# Patient Record
Sex: FEMALE | URBAN - METROPOLITAN AREA
[De-identification: names, ages, dates, MRNs, and addresses within clinical notes are randomized per-mention and may not be internally consistent; named-entity substitution may affect disease eponyms.]

---

## 2020-11-14 ENCOUNTER — VIRTUAL VISIT (OUTPATIENT)
Dept: FAMILY MEDICINE | Facility: OTHER | Age: 30
End: 2020-11-14
Payer: COMMERCIAL

## 2020-11-14 PROCEDURE — 99421 OL DIG E/M SVC 5-10 MIN: CPT | Performed by: PHYSICIAN ASSISTANT

## 2020-11-15 NOTE — PROGRESS NOTES
"Date: 2020 12:28:15  Clinician: Lolis Rosas  Clinician NPI: [id number]  Patient: Kaay Tamez  Patient : 1990  Patient Address: [address] [place] [id number]  Patient Phone: [phone]  Visit Protocol: General skin conditions  Patient Summary:  Kaya is a 30 year old ( : 1990 ) female who initiated a [facility] Visit for evaluation of an unspecified skin condition. When asked the question \"Please sign me up to receive news, health information and promotions from [facility].\", Kaya responded \"No\".    Images of her skin condition were uploaded.  Her symptoms started more than a month ago and affect both sides of her body. The skin condition is located on her feet. The skin condition is black and yellow in color.   The affected area has dry/flaky skin. It feels itchy. The symptoms do not interfere with her sleep.   The skin condition has changed since the symptoms started. Description of changes as reported by the patient (free text): I am pretty sure I have toenail fungus but did not choose athlete's foot as the description only related to skin infection.  The fungus is affecting both of my big toe nails.  It started as a small amount of flakiness and yellowing on both of these nails.  The flakiness has increased to include most of the underside of the nail on my right foot.  the nail is detaching from the skin and most recently there has been blackening of the underside of my toenail.  It seems to have a redish purple tint.   Denied symptoms include blisters, drainage, hives, crusts, tender to touch, pain, warm to touch, pimples, burning, scabs, numbness, and sores. Kaya does not feel feverish. She does not have a rash in the shape of a bull's-eye.   Treatments or home remedies used to relieve the symptoms as reported by the patient (free text): I tried using the Izabela nail repair pen and it did improve the fungus symptoms.  It decreased the yellowing and the flakiness.  After using it " for over 2 months it seemed like the fungus was no longer active and that the Izabela might be causing my nail to detach from my skin so I stopped using it.  This was about 4 months ago.  My left toe seems to have continued improving.  My right toe also continued improving over time but has recently gotten worse.   Precipitating events   Kaay did not come in contact with any irritants prior to the onset of her symptoms and has not been in close contact with anyone that has similar symptoms. She also did not spend time in a wooded area, swim, travel, or spend excess time in the sun just before her symptoms started. Kaya did not get bitten or stung by an insect.   Pertinent medical history  Kaya has not experienced this skin condition before.   Kaya has had chickenpox, but has not had shingles in the past.    Kaya does not have a history or a family history of atopia. Ongoing medical conditions were denied.   Kaya does not need a return to work/school note.   Weight: 135 lbs   Kaya does not smoke or use smokeless tobacco.   She denies pregnancy and denies breastfeeding. She has menstruated in the past month.   Additional information as reported by the patient (free text): There was a small skin irritation between my smallest two toes around the time I noticed something was going on with my toenails but that has been gone for over 4 months now.  For around a year before the fungus started I had experienced horizontal cracks in my toenails on both toes.   Weight: 135 lbs    MEDICATIONS: No current medications, ALLERGIES: NKDA  Clinician Response:  Dear Kaya,     Nail Fungal Infection A nail fungal infection changes the way fingernails and toenails look. They may thicken, discolor, change shape, or split. This condition is hard to treat because nails grow slowly and have limited blood supply. The infection often comes back after treatment.  There are 2 types of medicines used to treat this condition:   *&nbsp;&nbsp;&nbsp;&nbsp;Antifungal medicines for the skin (topical).  These are applied to the skin and nail area. These medicines don't work well because they can't get deep into the nail. *&nbsp;&nbsp;&nbsp;&nbsp;Oral antifungal medicines. These medicines work better because they go into the nail from the inside out. But the infection may still come back. It may take 9 to 12 months for your nail to look normal again. This means you are cured. You can repeat treatment if needed. Most people take these medicines without any problems. It's rare to stop therapy because of side effects. But your healthcare provider may give you some monitoring tests. Talk about possible side effects with your provider before starting treatment. If medicines fail, the nail can be removed surgically or chemically. These methods physically remove the fungus from the body. This helps medical treatment be more effective.  If the changes in your nails are not bothering you, you may not need treatment. Discuss this with your healthcare provider.  Home care *&nbsp;&nbsp;&nbsp;&nbsp;Use medicines exactly as directed for as long as directed. Treating a fungal infection can take longer than other kinds of infections. *&nbsp;&nbsp;&nbsp;&nbsp;Smoking is a risk factor for fungal infection. This is one more reason to quit. *&nbsp;&nbsp;&nbsp;&nbsp;Wear absorbent socks, and shoes that let your feet breathe. Sweaty feet increase your risk of fungal infection. They also make an existing infection harder to treat. *&nbsp;&nbsp;&nbsp;&nbsp;Use footwear when in damp public places like swimming pools, gyms, and shower rooms. This will help you stay away from the fungus that grows there. *&nbsp;&nbsp;&nbsp;&nbsp;Don't share nail clippers or scissors with others. &nbsp; Follow-up care Follow up with your healthcare provider, or as advised. When to seek medical advice Call your healthcare provider right away if any of these occur:  *&nbsp;&nbsp;&nbsp;&nbsp;Skin by the nail becomes red, swollen, painful, or drains pus (a creamy yellow or white liquid) *&nbsp;&nbsp;&nbsp;&nbsp;Side effects from oral anti-fungal medicines Amber last reviewed this educational content on 7/1/2019 (c) [id number] [employer]. [address] [place] [address], [place] [address]. All rights reserved. This information is not intended as a substitute for professional medical care. Always follow your healthcare professional's instructions.  &nbsp; &nbsp;     Diagnosis: Tinea unguium  Diagnosis ICD: B35.1  Prescription: terbinafine HCl (Lamisil AT) 1 % topical cream 1 12 gram tube, 0 days supply. Apply 0 cream to the affected and surrounding areas of skin topically 1 time per day. Refills: 0, Refill as needed: no, Allow substitutions: yes

## 2021-11-30 ENCOUNTER — TRANSCRIBE ORDERS (OUTPATIENT)
Dept: MATERNAL FETAL MEDICINE | Facility: CLINIC | Age: 31
End: 2021-11-30
Payer: COMMERCIAL

## 2021-11-30 DIAGNOSIS — O26.90 PREGNANCY RELATED CONDITION, ANTEPARTUM: Primary | ICD-10-CM

## 2021-12-02 ENCOUNTER — PRE VISIT (OUTPATIENT)
Dept: MATERNAL FETAL MEDICINE | Facility: HOSPITAL | Age: 31
End: 2021-12-02
Payer: COMMERCIAL

## 2021-12-08 ENCOUNTER — ANCILLARY PROCEDURE (OUTPATIENT)
Dept: ULTRASOUND IMAGING | Facility: HOSPITAL | Age: 31
End: 2021-12-08
Payer: COMMERCIAL

## 2021-12-08 ENCOUNTER — OFFICE VISIT (OUTPATIENT)
Dept: MATERNAL FETAL MEDICINE | Facility: HOSPITAL | Age: 31
End: 2021-12-08
Payer: COMMERCIAL

## 2021-12-08 VITALS — DIASTOLIC BLOOD PRESSURE: 75 MMHG | HEART RATE: 92 BPM | SYSTOLIC BLOOD PRESSURE: 110 MMHG

## 2021-12-08 DIAGNOSIS — O26.90 PREGNANCY RELATED CONDITION, ANTEPARTUM: ICD-10-CM

## 2021-12-08 DIAGNOSIS — O36.5990 INTRAUTERINE GROWTH RESTRICTION AFFECTING ANTEPARTUM CARE OF MOTHER, SINGLE OR UNSPECIFIED FETUS: Primary | ICD-10-CM

## 2021-12-08 PROCEDURE — 76820 UMBILICAL ARTERY ECHO: CPT | Mod: 26 | Performed by: OBSTETRICS & GYNECOLOGY

## 2021-12-08 PROCEDURE — 76811 OB US DETAILED SNGL FETUS: CPT | Mod: 26 | Performed by: OBSTETRICS & GYNECOLOGY

## 2021-12-08 PROCEDURE — 76820 UMBILICAL ARTERY ECHO: CPT

## 2021-12-08 PROCEDURE — 99203 OFFICE O/P NEW LOW 30 MIN: CPT | Mod: 25 | Performed by: OBSTETRICS & GYNECOLOGY

## 2021-12-08 PROCEDURE — 59025 FETAL NON-STRESS TEST: CPT | Mod: 26 | Performed by: OBSTETRICS & GYNECOLOGY

## 2021-12-08 PROCEDURE — 59025 FETAL NON-STRESS TEST: CPT

## 2021-12-08 NOTE — PROGRESS NOTES
"Please see \"Imaging\" tab under \"Chart Review\" for details of today's visit.    Flaquito Morales    "